# Patient Record
Sex: FEMALE | Race: WHITE
[De-identification: names, ages, dates, MRNs, and addresses within clinical notes are randomized per-mention and may not be internally consistent; named-entity substitution may affect disease eponyms.]

---

## 2023-02-13 ENCOUNTER — HOSPITAL ENCOUNTER (EMERGENCY)
Dept: HOSPITAL 26 - MED | Age: 28
Discharge: ANOTHER HOSPITAL | End: 2023-02-13
Payer: COMMERCIAL

## 2023-02-13 VITALS — WEIGHT: 170 LBS | BODY MASS INDEX: 30.12 KG/M2 | HEIGHT: 63 IN

## 2023-02-13 VITALS — SYSTOLIC BLOOD PRESSURE: 116 MMHG | DIASTOLIC BLOOD PRESSURE: 72 MMHG

## 2023-02-13 VITALS — SYSTOLIC BLOOD PRESSURE: 108 MMHG | DIASTOLIC BLOOD PRESSURE: 64 MMHG

## 2023-02-13 DIAGNOSIS — Z88.0: ICD-10-CM

## 2023-02-13 DIAGNOSIS — Z20.822: ICD-10-CM

## 2023-02-13 DIAGNOSIS — K80.20: ICD-10-CM

## 2023-02-13 DIAGNOSIS — E86.0: ICD-10-CM

## 2023-02-13 DIAGNOSIS — K85.90: Primary | ICD-10-CM

## 2023-02-13 DIAGNOSIS — R11.2: ICD-10-CM

## 2023-02-13 LAB
ALBUMIN FLD-MCNC: 3.9 G/DL (ref 3.4–5)
ANION GAP SERPL CALCULATED.3IONS-SCNC: 14.9 MMOL/L (ref 8–16)
APPEARANCE UR: CLEAR
AST SERPL-CCNC: 224 U/L (ref 15–37)
BASOPHILS # BLD AUTO: 0.1 K/UL (ref 0–0.22)
BASOPHILS NFR BLD AUTO: 0.4 % (ref 0–2)
BILIRUB SERPL-MCNC: 1 MG/DL (ref 0–1)
BILIRUB UR QL STRIP: (no result)
BUN SERPL-MCNC: 14 MG/DL (ref 7–18)
CHLORIDE SERPL-SCNC: 102 MMOL/L (ref 98–107)
CO2 SERPL-SCNC: 27.6 MMOL/L (ref 21–32)
COLOR UR: YELLOW
CREAT SERPL-MCNC: 0.8 MG/DL (ref 0.6–1.3)
EOSINOPHIL # BLD AUTO: 0.2 K/UL (ref 0–0.4)
EOSINOPHIL NFR BLD AUTO: 1.2 % (ref 0–4)
ERYTHROCYTE [DISTWIDTH] IN BLOOD BY AUTOMATED COUNT: 13.7 % (ref 11.6–13.7)
GFR SERPL CREATININE-BSD FRML MDRD: 112 ML/MIN (ref 90–?)
GLUCOSE SERPL-MCNC: 129 MG/DL (ref 74–106)
GLUCOSE UR STRIP-MCNC: NEGATIVE MG/DL
HCT VFR BLD AUTO: 37 % (ref 36–48)
HGB BLD-MCNC: 12.3 G/DL (ref 12–16)
HGB UR QL STRIP: NEGATIVE
LEUKOCYTE ESTERASE UR QL STRIP: NEGATIVE
LYMPHOCYTES # BLD AUTO: 2.2 K/UL (ref 2.5–16.5)
LYMPHOCYTES NFR BLD AUTO: 10.9 % (ref 20.5–51.1)
MCH RBC QN AUTO: 28 PG (ref 27–31)
MCHC RBC AUTO-ENTMCNC: 33 G/DL (ref 33–37)
MCV RBC AUTO: 85.7 FL (ref 80–94)
MONOCYTES # BLD AUTO: 1.5 K/UL (ref 0.8–1)
MONOCYTES NFR BLD AUTO: 7.1 % (ref 1.7–9.3)
NEUTROPHILS # BLD AUTO: 16.5 K/UL (ref 1.8–7.7)
NEUTROPHILS NFR BLD AUTO: 80.4 % (ref 42.2–75.2)
NITRITE UR QL STRIP: NEGATIVE
PH UR STRIP: 7 [PH] (ref 5–9)
PLATELET # BLD AUTO: 417 K/UL (ref 140–450)
POTASSIUM SERPL-SCNC: 3.5 MMOL/L (ref 3.5–5.1)
RBC # BLD AUTO: 4.32 MIL/UL (ref 4.2–5.4)
SODIUM SERPL-SCNC: 141 MMOL/L (ref 136–145)
WBC # BLD AUTO: 20.5 K/UL (ref 4.8–10.8)

## 2023-02-13 PROCEDURE — 83690 ASSAY OF LIPASE: CPT

## 2023-02-13 PROCEDURE — 76705 ECHO EXAM OF ABDOMEN: CPT

## 2023-02-13 PROCEDURE — 36415 COLL VENOUS BLD VENIPUNCTURE: CPT

## 2023-02-13 PROCEDURE — 85025 COMPLETE CBC W/AUTO DIFF WBC: CPT

## 2023-02-13 PROCEDURE — 96361 HYDRATE IV INFUSION ADD-ON: CPT

## 2023-02-13 PROCEDURE — 87426 SARSCOV CORONAVIRUS AG IA: CPT

## 2023-02-13 PROCEDURE — 96375 TX/PRO/DX INJ NEW DRUG ADDON: CPT

## 2023-02-13 PROCEDURE — 81003 URINALYSIS AUTO W/O SCOPE: CPT

## 2023-02-13 PROCEDURE — 81025 URINE PREGNANCY TEST: CPT

## 2023-02-13 PROCEDURE — 80053 COMPREHEN METABOLIC PANEL: CPT

## 2023-02-13 PROCEDURE — 87040 BLOOD CULTURE FOR BACTERIA: CPT

## 2023-02-13 PROCEDURE — 99285 EMERGENCY DEPT VISIT HI MDM: CPT

## 2023-02-13 PROCEDURE — 96374 THER/PROPH/DIAG INJ IV PUSH: CPT

## 2023-02-13 NOTE — NUR
CALLED REPORT TO DAVE AT [facility] ([phone]), PATIENT WILL BE 
TRANSFERRING TO THE CARE OF DR. AGUIRRE AND WILL BE PLACEDE IN ROOM 2143

PROVIDED CURRENT VITALS AND LIPASE VALUE.

TRANSPORT ARRANGED BY  FROM PATIENT'S MEDICAL GROUP; WILL ARRIVE 
WITHIN 45 MINUTES TO HR.  





DX: GALLSTONES, PANCREATITIS, TRANAMINITIS, DEHYDRATION, NAUSEA/VOMITING

## 2023-02-13 NOTE — NUR
Report given to [facility] EMTs for transport to [facility]. Pt in stable condition, 
vss, no ss of acute distress, breathing equal and unlabored. Packet given to 
transport.

## 2024-10-20 ENCOUNTER — HOSPITAL ENCOUNTER (EMERGENCY)
Dept: HOSPITAL 26 - MED | Age: 29
Discharge: HOME | End: 2024-10-20
Payer: COMMERCIAL

## 2024-10-20 VITALS — WEIGHT: 190 LBS | BODY MASS INDEX: 35.87 KG/M2 | HEIGHT: 61 IN

## 2024-10-20 VITALS
DIASTOLIC BLOOD PRESSURE: 80 MMHG | OXYGEN SATURATION: 97 % | TEMPERATURE: 98.2 F | SYSTOLIC BLOOD PRESSURE: 120 MMHG | RESPIRATION RATE: 18 BRPM | HEART RATE: 89 BPM

## 2024-10-20 VITALS
RESPIRATION RATE: 12 BRPM | SYSTOLIC BLOOD PRESSURE: 120 MMHG | OXYGEN SATURATION: 97 % | HEART RATE: 90 BPM | TEMPERATURE: 98.2 F | DIASTOLIC BLOOD PRESSURE: 80 MMHG

## 2024-10-20 VITALS — OXYGEN SATURATION: 97 % | HEART RATE: 90 BPM | RESPIRATION RATE: 12 BRPM

## 2024-10-20 DIAGNOSIS — R07.89: ICD-10-CM

## 2024-10-20 DIAGNOSIS — R06.02: Primary | ICD-10-CM

## 2024-10-20 DIAGNOSIS — Z90.49: ICD-10-CM

## 2024-10-20 DIAGNOSIS — Z88.0: ICD-10-CM

## 2024-10-20 PROCEDURE — 99283 EMERGENCY DEPT VISIT LOW MDM: CPT

## 2024-10-20 PROCEDURE — 94640 AIRWAY INHALATION TREATMENT: CPT

## 2024-10-20 RX ADMIN — IPRATROPIUM BROMIDE AND ALBUTEROL SULFATE ONE ML: .5; 3 SOLUTION RESPIRATORY (INHALATION) at 22:26

## 2024-10-20 RX ADMIN — ALBUTEROL SULFATE ONE MG: 2.5 SOLUTION RESPIRATORY (INHALATION) at 22:26
